# Patient Record
Sex: FEMALE | Race: WHITE
[De-identification: names, ages, dates, MRNs, and addresses within clinical notes are randomized per-mention and may not be internally consistent; named-entity substitution may affect disease eponyms.]

---

## 2019-12-02 ENCOUNTER — HOSPITAL ENCOUNTER (OUTPATIENT)
Dept: HOSPITAL 61 - KCIC MRI | Age: 74
Discharge: HOME | End: 2019-12-02
Attending: FAMILY MEDICINE
Payer: COMMERCIAL

## 2019-12-02 DIAGNOSIS — M12.88: ICD-10-CM

## 2019-12-02 DIAGNOSIS — M48.062: ICD-10-CM

## 2019-12-02 DIAGNOSIS — M51.16: Primary | ICD-10-CM

## 2019-12-02 DIAGNOSIS — N28.1: ICD-10-CM

## 2019-12-02 DIAGNOSIS — M85.68: ICD-10-CM

## 2019-12-02 PROCEDURE — 72148 MRI LUMBAR SPINE W/O DYE: CPT

## 2019-12-02 NOTE — [facility]
LUMBAR SPINE WO CONTRAST 

 

Date: 12/2/2019 12:30 PM 

 

Indication:  Low back pain with left lower extremity radiculopathy 

 

Comparison:  None. 

 

Technique: Multi-planar multi-weighted magnetic resonance imaging of the 

lumbar spine was performed without intravenous contrast using the standard

lumbar spine protocol.

 

FINDINGS:

 

Postsurgical changes of posterior decompression and instrumentation at 

L4-5.

 

Left convex lumbar curvature. No acute fracture. Mild to moderate 

multilevel degenerative disc desiccation and disc height loss. 

Degenerative endplate edema at L3-4.

 

The conus terminates at a normal level. No abnormal signal is seen within 

the visualized distal spinal cord. No clumping of intrathecal nerve roots.

Sacral Tarlov cysts.

 

Small bilateral renal cysts.

 

T12-L1: No disc bulge. No facet arthropathy. No significant spinal 

stenosis or neural foraminal narrowing. 

 

L1-L2: Disc bulge. No facet arthropathy. No significant spinal stenosis or

neural foraminal narrowing. 

 

L2-L3: Disc bulge with left far lateral protrusion. Mild facet 

arthropathy. Mild spinal stenosis. No significant neural foraminal 

narrowing. 

 

L3-L4: Disc bulge with right far lateral protrusion. Moderate facet 

arthropathy. Ligament flavum thickening. Moderate spinal stenosis. Mild 

lateral recess narrowing. Moderate to severe right and mild left neural 

foraminal narrowing. 

 

L4-L5: Left hemilaminectomy. Left far lateral protrusion. No spinal 

stenosis. Mild right and moderate left neuroforaminal narrowing. 

 

L5-S1: Disc bulge with left far lateral protrusion. Ligamentum flavum 

thickening. Mild spinal stenosis. Mild left lateral recess narrowing. 

Moderate left neural foraminal narrowing. 

 

IMPRESSION:

 

Marked levoscoliosis and moderate lumbar spondylosis, detailed level by 

level above.

 

Electronically signed by: Acosta Smith MD (12/2/2019 3:19 PM) [facility]

## 2020-08-04 ENCOUNTER — HOSPITAL ENCOUNTER (OUTPATIENT)
Dept: HOSPITAL 61 - PNCL | Age: 75
Discharge: HOME | End: 2020-08-04
Attending: ANESTHESIOLOGY
Payer: MEDICARE

## 2020-08-04 DIAGNOSIS — I10: ICD-10-CM

## 2020-08-04 DIAGNOSIS — M54.5: Primary | ICD-10-CM

## 2020-08-04 DIAGNOSIS — M19.90: ICD-10-CM

## 2020-08-04 DIAGNOSIS — Z79.899: ICD-10-CM

## 2020-08-04 DIAGNOSIS — M48.061: ICD-10-CM

## 2020-08-04 DIAGNOSIS — M79.662: ICD-10-CM

## 2020-08-04 DIAGNOSIS — E78.5: ICD-10-CM

## 2020-08-04 DIAGNOSIS — Z98.890: ICD-10-CM

## 2020-08-04 PROCEDURE — G0463 HOSPITAL OUTPT CLINIC VISIT: HCPCS

## 2020-08-04 NOTE — PDOC2
INITIAL PAIN CONSULT


DATE OF SERVICE:


DOS:


DATE: 8/4/20 


TIME: 14:35





CHIEF COMPLAINT:


Chief Complaint:


Low back and left lower extremity pain





HISTORY OF PRESENT ILLNESS:


Ms. Horne is 74-year-old female presents with history of low back left lower 

extremity pain for many years status post lumbar discectomy and fusion L4-5 in 

2016 patient reports that the pain never went away still has significant pain in

the low back in the left lower extremity without improvement after surgery 

patient which is getting worse with walking standing changing positions is 

walking with a cane in her right hand describes the pain is sharp and shooting 

radiating changes during the day worse with activity tingling burning and 

cramping patient reports that it is generally not awaken her from sleep at night

much better with sitting or laying down does not affect her bowel bladder 

control it does affect ability to walk significantly and again is using a cane 

in her right hand she had surgery at [facility] with Dr. Aldrich & has had previous chiropractic treatment, exercises, physical therapy 

continues to do the exercise ;she also takes hydrocodone routinely for the pain 

which is helpful by only about 50%.  Patient rates her disability rating of 0-10

10 being the worst as a 10 with family home responsibilities recreation and 

occupation 8 with social activity 5 with self-care 0 with life support 

activities


Patient did have a MRI scan of the lumbar spine dated December 2019 showing disc

bulge with left far lateral protrusion at L2-3 L3-4 disc bulge with right far 

lateral protrusion L4-5 left hemilaminectomy with far left lateral protrusion 

L5-S1 shows disc bulge with far left lateral protrusion and mild spinal 

stenosis.





PAST MEDICAL HISTORY:


PMH:


Hypertension hyperlipidemia arthritis scoliosis blood clots but postsurgical, 

PSVT.





PREVIOUS SURGERIES:


Past Surgical Hx:


Appendectomy 1978 ORIF ulna 1999 right knee scope 2010 lumbar laminectomy 2016





CURRENT MEDICATIONS:


Current Meds:





Active Scripts








 Medications  Dose


 Route/Sig


 Max Daily Dose Days Date Category


 


 Latanoprost 2.5


 Ml Drops  1 Drop


 EACHEYE QHS


   8/4/20 Reported


 


 Hydrocodone-Apap


   **


  (Hydrocodone


 Bit/Acetaminophen)


 1 Tab Tablet  1 Tab


 PO PRN Q6HRS PRN


   8/4/20 Reported


 


 Eliquis


  (Apixaban) 5 Mg


 Tablet  5 Mg


 PO BID


   8/4/20 Reported


 


 Lisinopril 40 Mg


 Tablet  1 Tab


 PO DAILY


   8/4/20 Reported


 


 Gabapentin **


  (Gabapentin) 300


 Mg Capsule  300 Mg


 PO BID


   8/4/20 Reported


 


 Clonidine Hcl 0.1


 Mg Tablet  0.1 Mg


 PO PRN PRN


   8/4/20 Reported


 


 Diclofenac Sodium


 75 Mg Tablet.dr  1 Tab


 PO BID


   8/4/20 Reported


 


 Potassium


 Chloride **


  (Potassium


 Chloride) 10 Meq


 Capsule.er  1 Cap


 PO BID


   8/27/15 Reported











ALLERGIES;


Allergies:  


Coded Allergies:  


     No Known Drug Allergies (Unverified , 5/21/14)





FAMILY HISTORY:


Family Hx:


Significant for cancers





SOCIAL HISTORY:


Social Hx:


Patient is not drink alcohol does not smoke does not use any illegal illicit or 

recreational drugs is  lives with her spouse lives locally in [facility] [place] [facility] has 1 great grandchild living with them and reports that she is currently

retired.





REVIEW OF SYSTEMS:


ROS:


Patient review of systems is positive for those items mentioned in his present 

illness all systems reviewed otherwise negative complete formal document on 

patient's chart





PHYSICAL EXAM:


VS:


Blood pressure is 139/88 pulse 52 respirations 18 temperature is 90.1 F weight 

is 1 4 9 pounds


PE:


PHYSICAL EXAMINATION:





GENERAL: The patient is awake, alert, oriented, appropriate, very pleasant in 

demeanor


HEENT:  normocephalic, atraumatic.  Extraocular movements are intact and 

symmetrical.  Oral cavity: Mucous membranes moist and pink.  Dentition is 

intact.


NECK:  anterior throat supple without palpable lymphadenopathy noted.  Swallow 

reflex symmetrical.


CHEST:  normal on inspection.  Breath sounds are clear bilaterally no rales 

rhonchi or wheezes auscultated..


HEART: Shows S1, S2 clear.  No murmurs auscultated.


ABDOMEN: Soft, nontender, nondistended.  No palpable organomegaly is noted.  No 

rebound or guarding demonstrated.


BACK: Shows spine grossly with leftward lumbar scoliosis and minor hypertrophy 

of the lower thoracic and upper middle lower lumbar paraspinous musculature 

compared to the right..  Normal-appearing cervical lordotic curvature.  There is

slightly increased thoracic kyphosis, some minor flattening of the lumbar 

lordotic curvature.  Lumbar paraspinous muscles show hypertrophy on the left on 

inspection, on palpation shows some moderate tenderness diffusely throughout the

upper, middle and lower distribution of the paraspinous muscles bilaterally and 

also into the lower thoracic paraspinous musculature, but without specific 

trigger points, without radiation of pain.  The patient has good rotational 

motion of the lumbar spine, both laterally as well as extension and flexion 

without significant difficulty.  No tenderness over the spinous processes, 

sacrum or sacroiliac regions.  Well-healed surgical scarring is noted in the 

midline.


EXTREMITIES: Lower extremities show deep tendon reflexes 1+ in the patellar and 

tendo calcaneus tendons.  Motor exam is 5 on a scale of 5 with right 

dorsiflexion, extension, quadriceps and hamstring flexion and 4/5 on the left.  

Peripheral pulses are 1+ posterior tibial.  No peripheral edema is noted 

bilaterally.  Lower extremities are warm and dry to touch, equal in color and 

appearance.  Straight leg raise noted to be negative on the right, left side is 

positive at 35 degrees.  Gaenslen's and Gilmar's maneuvers are negative as 

well.  The patient is able to stand, stand on her toes but loses balance quickly

when standing with all of her weight on her left leg.  Walks with a antalgic g

ait does favor the left lower extremity significantly and is using a cane to 

ambulate in the right hand..


SKIN: Shows warm and dry, good turgor.  No edema.  No sores, rashes or bruising 

throughout.





IMPRESSION:


Impression:


This is a 74-year-old female with a long history of low back and left lower 

extremity pain status post lumbar laminectomy with fusion 2016 with persistent 

radiculopathy in the left lower extremity in a L5-S1 dermatomal distribution.


Options were discussed with the patient including conservative medical 

management and physical therapies interventional techniques, and she would like 

to pursue interventional techniques.  We discussed a lumbar epidural steroid 

injection using descriptions as well as anatomical models to describe the 

procedure.  We will first wait for clearance from her cardiologist to hold 

Eliquis for 3 days prior to potential injection if deemed safe and appropriate 

will have patient return while holding this and plan on lumbar epidural steroid 

injection at that time.  We also discussed potential of a spinal cord stimulator

and patient was given some information regarding this today as well.  Patient to

follow-up in approximately 1 week as scheduled.











CRYS KIRKLAND MD                Aug 4, 2020 14:47

## 2020-08-20 ENCOUNTER — HOSPITAL ENCOUNTER (OUTPATIENT)
Dept: HOSPITAL 61 - PNCL | Age: 75
Discharge: HOME | End: 2020-08-20
Attending: ANESTHESIOLOGY
Payer: MEDICARE

## 2020-08-20 DIAGNOSIS — M48.061: ICD-10-CM

## 2020-08-20 DIAGNOSIS — I10: ICD-10-CM

## 2020-08-20 DIAGNOSIS — M51.16: Primary | ICD-10-CM

## 2020-08-20 DIAGNOSIS — E78.5: ICD-10-CM

## 2020-08-20 DIAGNOSIS — Z79.899: ICD-10-CM

## 2020-08-20 PROCEDURE — 62323 NJX INTERLAMINAR LMBR/SAC: CPT

## 2020-08-20 NOTE — PDOC
Progress Note - Pain Clinic


Date of Service:


DOS:


DATE: 8/20/20 


TIME: 11:13





Diagnosis:


Dx:


Lumbar to colopathy with lumbar degenerative disc disease and lumbar spinal 

stenosis with post lumbar laminectomy syndrome





History or Present Illness:


HPI:


74-year-old female returns follow-up status post initial evaluation and 

preauthorization with clearance to hold her Eliquis for 3 days.  Patient has obt

ained this from her cardiologist and has been off of the Eliquis now for 3 days 

returns still with significant pain low back and into the left lower extremity. 

Reports pain is a 9 on scale of 10 is worse of the past week 6 on average to its

least is a 6 today.  Patient reported sharp tingling stabbing radiating severe 

in the posterior gluteus and the left side posterior left thigh left lower leg 

into the foot on the left side with walking standing changing positions better 

with sitting and laying down does not awaken her from sleep at night.  Patient 

reports no new motor or sensory deficits no new bowel or bladder complaints.





Physical Exam:


VS:


Blood pressure is 117/71 pulse 50 respirations are 20 temperature 97.8 F weight

is 147 pounds


PE:


PHYSICAL EXAMINATION:





GENERAL: The patient is awake, alert, oriented, appropriate, very pleasant 

demeanor


HEENT: Shows normocephalic, atraumatic.  Extraocular movements are intact and 

symmetrical.  Oral cavity: Mucous membranes moist and pink.  


NECK: Shows anterior throat supple without palpable lymphadenopathy noted.  

Swallow reflex symmetrical.


CHEST: Shows normal on inspection.  Breath sounds are clear bilaterally, no 

rales rhonchi or wheezes auscultated.


HEART: Shows S1, S2 clear.  No murmurs auscultated.


ABDOMEN: Soft, nontender, nondistended.  No palpable organomegaly is noted.  No 

rebound or guarding demonstrated.


BACK: Shows spine grossly in the midline.  Normal-appearing cervical lordotic 

curvature.  There is slightly increased thoracic kyphosis, some minor flattening

of the lumbar lordotic curvature.  Lumbar paraspinous muscles show symmetrical 

on inspection, on palpation shows some moderate tenderness diffusely throughout 

the upper, middle and lower distribution of the paraspinous muscles bilaterally 

without specific trigger points, without radiation of pain.  The patient has 

good rotational motion of the lumbar spine, both laterally as well as extension 

and flexion without significant difficulty.  No tenderness over the spinous 

processes, sacrum or sacroiliac regions.


EXTREMITIES: Lower extremities show deep tendon reflexes 1+ in the patellar and 

tendo calcaneus tendons.  Motor exam is 5 on a scale of 5 with right 

dorsiflexion, extension, quadriceps and hamstring flexion and 4/5 on the left.  

Peripheral pulses are 1+ posterior tibial.  No peripheral edema is noted 

bilaterally.  Lower extremities are warm and dry to touch, equal in color and 

appearance.  


SKIN: Shows warm and dry, good turgor.  No edema.  No sores, rashes or bruising 

throughout.





Procedure:


Procedure:


Options were discussed with the patient.  Patient chart was reviewed as her 

current medication regimen updated current review of systems updated today as 

well.  We will proceed with a lumbar epidural steroid injection today.  Risks 

are again discussed including but not limited to bleeding infection possibility 

of epidural hematoma subsequent neurological compromise dural puncture headache 

spinal cord and or nerve damage side effects of steroid medication and poor 

results regarding pain control.  Patient understands wished to proceed.  Patient

return to clinic in possibly 2 weeks for follow-up was counseled as return 

appointment to level and side effects be aware of.





Medication Injected:


Med Injected:


Procedure is lumbar epidural steroid injection under local anesthetic using 

sterile prep and drape at the L5-S1 level using C-arm fluoroscopic guidance in 

both AP and lateral views medications injected is 120 mg Depo-Medrol + 10 mL 

preservative-free normal saline and 2 mL  contrast- condition at discharge is 

stable patient tolerated procedure well had no complications.





Condition at Discharge:


Condition at Discharge:


Condition at discharge is stable patient, procedure well had no complications.











CRYS KIRKLAND MD               Aug 20, 2020 11:17